# Patient Record
Sex: MALE | Race: WHITE | URBAN - METROPOLITAN AREA
[De-identification: names, ages, dates, MRNs, and addresses within clinical notes are randomized per-mention and may not be internally consistent; named-entity substitution may affect disease eponyms.]

---

## 2019-08-05 ENCOUNTER — EMERGENCY (EMERGENCY)
Facility: HOSPITAL | Age: 34
LOS: 1 days | Discharge: HOME | End: 2019-08-05
Attending: EMERGENCY MEDICINE
Payer: SELF-PAY

## 2019-08-05 VITALS
RESPIRATION RATE: 20 BRPM | SYSTOLIC BLOOD PRESSURE: 124 MMHG | OXYGEN SATURATION: 99 % | HEIGHT: 61 IN | HEART RATE: 86 BPM | TEMPERATURE: 98 F | DIASTOLIC BLOOD PRESSURE: 74 MMHG | WEIGHT: 169.98 LBS

## 2019-08-05 PROCEDURE — 12002 RPR S/N/AX/GEN/TRNK2.6-7.5CM: CPT

## 2019-08-05 PROCEDURE — 99283 EMERGENCY DEPT VISIT LOW MDM: CPT | Mod: 25

## 2019-08-05 PROCEDURE — 99282 EMERGENCY DEPT VISIT SF MDM: CPT | Mod: 25

## 2019-08-05 NOTE — ED STATDOCS - CARE PLAN
Principal Discharge DX:	Laceration of arm, left, initial encounter  Assessment and plan of treatment:	Keep wound clean: wash twice daily with soapy water and apply antibiotic ointment and bandage.  Return in 12 days for suture removal: sooner if signs of infection (redness, swelling, pain or discharge).

## 2019-08-05 NOTE — ED STATDOCS - OBJECTIVE STATEMENT
34 y/o M pt presents to the ED c/o laceration to BEE.  Pt was at work today and states there was a piece of sheet metal on a puller machine, and the sheet metal cut his medial L upper arm.  Last tetanus shot was 9 months ago.  Denies numbness, tingling.  No further acute complaints at this time. 32 y/o M pt presents to the ED c/o laceration to BEE.  Pt was at work today and sustained laceration from a metal roller/ pulley.   Last tetanus shot was 9 months ago.  Denies numbness, tingling.  No further acute complaints at this time.

## 2019-08-05 NOTE — ED STATDOCS - NS_ ATTENDINGSCRIBEDETAILS _ED_A_ED_FT
I, Pedro Luis Edmondson, performed the initial face to face bedside interview with this patient regarding history of present illness, review of symptoms and relevant past medical, social and family history.  I completed an independent physical examination.  I was the provider who initially evaluated this patient.  The history, relevant review of systems, past medical and surgical history, medical decision making, and physical examination was documented by the scribe in my presence and I attest to the accuracy of the documentation. Follow-up on ordered tests (ie labs, radiologic studies) and re-evaluation of the patient's status has been communicated to the ACP.  Disposition of the patient will be based on test outcome and response to ED interventions.

## 2019-08-05 NOTE — ED STATDOCS - PHYSICAL EXAMINATION
Constitutional: non-toxic, no acute distress  MSK: FROM of LUE  SKIN: 3cm laceration to medial aspect of L humerus, no active bleeding  NEURO: distal sensation grossly intact, radial/ulnar/median nerve intact